# Patient Record
Sex: FEMALE | Race: WHITE
[De-identification: names, ages, dates, MRNs, and addresses within clinical notes are randomized per-mention and may not be internally consistent; named-entity substitution may affect disease eponyms.]

---

## 2017-09-29 ENCOUNTER — HOSPITAL ENCOUNTER (OUTPATIENT)
Dept: HOSPITAL 59 - HOP | Age: 57
Discharge: HOME | End: 2017-09-29
Attending: INTERNAL MEDICINE
Payer: COMMERCIAL

## 2017-09-29 DIAGNOSIS — K29.70: ICD-10-CM

## 2017-09-29 DIAGNOSIS — R13.10: Primary | ICD-10-CM

## 2017-09-29 DIAGNOSIS — Z79.4: ICD-10-CM

## 2017-09-29 DIAGNOSIS — E11.9: ICD-10-CM

## 2017-09-29 PROCEDURE — 43235 EGD DIAGNOSTIC BRUSH WASH: CPT

## 2017-09-29 PROCEDURE — 00740: CPT

## 2017-10-02 NOTE — OPERATIVE NOTE
DATE OF SURGERY: 09/29/2017 



SURGEON: Ese Bazan MD  



OPERATION: ESOPHAGOGASTRODUODENOSCOPY. 



INDICATIONS: This is a 57-year-old female with a history of intermittent episodes of dysphagia who 
presented for esophagogastroduodenoscopy. 



POSTOPERATIVE DIAGNOSES:

1. Normal esophagus with no specific stricture, status post Weiss dilator 60-French. 

2. Diffuse gastritis.

3. Normal duodenum.  



ANESTHESIA: Sedation is per Anesthesia. Pulse oximetry was monitored throughout the procedure to 
maintain O2 saturation of 90% or greater. Supplemental oxygen was administered via nasal cannula. 
Cardiac and vital signs were monitored throughout the duration of the procedure, and they were 
stable. 



The procedure of esophagogastroduodenoscopy and risks and benefits of the procedure, including the 
risk of bleeding and perforation, among others, were explained to the patient who voiced 
understanding and desired to have the procedure done. Physical examination was performed, and the 
patient was found stable for sedation. 



PROCEDURE: The patient was placed in the left lateral position and sedation was initiated. A plastic 
bite block was inserted into the oral cavity. The Olympus XOH813 gastroscope was introduced into the 
oral cavity and advanced to the proximal esophagus without difficulty. The esophageal mucosa was 
carefully examined upon introduction of the gastroscope. The proximal, mid, and distal esophageal 
mucosa appeared normal. The gastroscope was then advanced into the stomach, and surveillance of the 
stomach revealed diffuse erythema involving the gastric body and antrum but no ulcers were noted. 
The gastroscope was then advanced to the descending duodenum without difficulty. The duodenal bulb 
and descending duodenum appeared normal. The gastroscope was then withdrawn into the stomach and 
retroflexion was performed. There were no other lesions noted. The gastroscope was then withdrawn 
while carefully examining the gastric and esophageal mucosa. No other lesions noted. Multiple 
gastric and mid esophageal biopsies were obtained. She remained with stable vital signs. A Weiss 
dilator size 60-French was then passed to the stomach with minimal resistance. The Weiss dilator 
was then withdrawn and the procedures were terminated. The patient tolerated the procedures well 
without any immediate complications. She remained with stable vital signs and was transferred to the 
recovery room. 



RECOMMENDATIONS: 

1. The patient should continue on her proton pump inhibitors.

2. I will be happy to see her back in the office as needed.  



Thank you for allowing me to participate in the care of your patient. CC: Dr. Hossein CAMPOS

## 2018-11-02 ENCOUNTER — HOSPITAL ENCOUNTER (OUTPATIENT)
Dept: HOSPITAL 59 - HOP | Age: 58
Discharge: HOME | End: 2018-11-02
Attending: INTERNAL MEDICINE
Payer: COMMERCIAL

## 2018-11-02 DIAGNOSIS — Z12.11: Primary | ICD-10-CM

## 2018-11-02 DIAGNOSIS — Z86.010: ICD-10-CM

## 2018-11-02 PROCEDURE — 00812 ANES LWR INTST SCR COLSC: CPT

## 2018-11-05 NOTE — OPERATIVE NOTE
DATE OF SURGERY: 11/02/2018



SURGEON: Ese Bazan MD  



OPERATION: COLONOSCOPY. 



INDICATIONS: This is a 58-year-old female with history of colon polyps who presented for 
surveillance colonoscopy. Last colonoscopy was 3 years ago. 



POSTOPERATIVE DIAGNOSIS: Normal colon. 



ANESTHESIA: Sedation is per Anesthesia. Pulse oximetry was monitored throughout the procedure to 
maintain O2 saturation of 90% or greater. Supplemental oxygen was administered via nasal cannula. 
Cardiac and vital signs were monitored throughout the duration of the procedure, and they were 
stable. 



The procedure of colonoscopy and risks and alternatives of the procedure, including the risk of 
bleeding and perforation, among others, were explained to the patient who voiced understanding and 
agreed to have the procedure done. Physical examination was performed, and the patient was found 
stable for sedation. 



PROCEDURE: The patient was placed in the left lateral position. Sedation was initiated. A digital 
rectal exam was performed and showed some mild external hemorrhoids with no palpable rectal masses. 
An Olympus PCF-180AL colonoscope was then inserted into the rectum under direct visualization. It 
was advanced to the cecum without difficulty. The ileocecal valve and appendiceal orifice were 
identified and photographed. The colonic mucosa was carefully examined upon introduction of the 
colonoscope. There were no lesions noted. The colonoscope was then withdrawn while carefully 
examining the colonic mucosal surfaces. No lesions were noted. In the rectum, retroflexion was 
performed and grade 1 internal hemorrhoids were noted. The colonoscope was then withdrawn and the 
procedure was terminated. The patient tolerated the procedure well without any immediate 
complications. The patient remained with stable vital signs and was transferred to the recovery 
room. 



RECOMMENDATIONS: 

1. The patient should be on a high-fiber diet. 

2. The patient is to have a repeat colonoscopy for surveillance in 5 years. 



Thank you for allowing me to participate in the care of your patient. CC: DO ANDRES Gilbert